# Patient Record
Sex: FEMALE | Race: WHITE
[De-identification: names, ages, dates, MRNs, and addresses within clinical notes are randomized per-mention and may not be internally consistent; named-entity substitution may affect disease eponyms.]

---

## 2017-03-31 ENCOUNTER — HOSPITAL ENCOUNTER (OUTPATIENT)
Dept: HOSPITAL 62 - WI | Age: 82
End: 2017-03-31
Attending: FAMILY MEDICINE
Payer: MEDICARE

## 2017-03-31 DIAGNOSIS — Z12.31: Primary | ICD-10-CM

## 2017-03-31 DIAGNOSIS — M81.0: ICD-10-CM

## 2017-03-31 PROCEDURE — G0202 SCR MAMMO BI INCL CAD: HCPCS

## 2017-03-31 PROCEDURE — 77067 SCR MAMMO BI INCL CAD: CPT

## 2017-03-31 PROCEDURE — 77080 DXA BONE DENSITY AXIAL: CPT

## 2017-05-08 ENCOUNTER — HOSPITAL ENCOUNTER (OUTPATIENT)
Dept: HOSPITAL 62 - RAD | Age: 82
End: 2017-05-08
Attending: FAMILY MEDICINE
Payer: MEDICARE

## 2017-05-08 DIAGNOSIS — K45.8: Primary | ICD-10-CM

## 2017-05-08 DIAGNOSIS — R51: ICD-10-CM

## 2017-05-08 PROCEDURE — 70450 CT HEAD/BRAIN W/O DYE: CPT

## 2017-05-08 PROCEDURE — 74176 CT ABD & PELVIS W/O CONTRAST: CPT

## 2018-04-30 ENCOUNTER — HOSPITAL ENCOUNTER (EMERGENCY)
Dept: HOSPITAL 62 - ER | Age: 83
LOS: 1 days | Discharge: HOME | End: 2018-05-01
Payer: MEDICARE

## 2018-04-30 DIAGNOSIS — E11.9: ICD-10-CM
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
## 2018-04-30 DIAGNOSIS — G89.29: ICD-10-CM

## 2018-04-30 DIAGNOSIS — R10.84: Primary | ICD-10-CM
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
## 2018-04-30 DIAGNOSIS — E03.9: ICD-10-CM

## 2018-04-30 DIAGNOSIS — M54.9: ICD-10-CM

## 2018-04-30 DIAGNOSIS — E78.00: ICD-10-CM

## 2018-04-30 LAB
ADD MANUAL DIFF: NO
ALBUMIN SERPL-MCNC: 3.8 G/DL (ref 3.5–5)
ALP SERPL-CCNC: 64 U/L (ref 38–126)
ALT SERPL-CCNC: 19 U/L (ref 9–52)
ANION GAP SERPL CALC-SCNC: 11 MMOL/L (ref 5–19)
AST SERPL-CCNC: 21 U/L (ref 14–36)
BASOPHILS # BLD AUTO: 0 10^3/UL (ref 0–0.2)
BASOPHILS NFR BLD AUTO: 0.5 % (ref 0–2)
BILIRUB DIRECT SERPL-MCNC: 0.3 MG/DL (ref 0–0.4)
BILIRUB SERPL-MCNC: 0.3 MG/DL (ref 0.2–1.3)
BUN SERPL-MCNC: 18 MG/DL (ref 7–20)
CALCIUM: 9.5 MG/DL (ref 8.4–10.2)
CHLORIDE SERPL-SCNC: 109 MMOL/L (ref 98–107)
CO2 SERPL-SCNC: 25 MMOL/L (ref 22–30)
EOSINOPHIL # BLD AUTO: 0.2 10^3/UL (ref 0–0.6)
EOSINOPHIL NFR BLD AUTO: 2.7 % (ref 0–6)
ERYTHROCYTE [DISTWIDTH] IN BLOOD BY AUTOMATED COUNT: 13.3 % (ref 11.5–14)
GLUCOSE SERPL-MCNC: 132 MG/DL (ref 75–110)
HCT VFR BLD CALC: 36.5 % (ref 36–47)
HGB BLD-MCNC: 12.2 G/DL (ref 12–15.5)
LIPASE SERPL-CCNC: 60.7 U/L (ref 23–300)
LYMPHOCYTES # BLD AUTO: 1.6 10^3/UL (ref 0.5–4.7)
LYMPHOCYTES NFR BLD AUTO: 26.2 % (ref 13–45)
MCH RBC QN AUTO: 32 PG (ref 27–33.4)
MCHC RBC AUTO-ENTMCNC: 33.5 G/DL (ref 32–36)
MCV RBC AUTO: 96 FL (ref 80–97)
MONOCYTES # BLD AUTO: 0.7 10^3/UL (ref 0.1–1.4)
MONOCYTES NFR BLD AUTO: 11.7 % (ref 3–13)
NEUTROPHILS # BLD AUTO: 3.7 10^3/UL (ref 1.7–8.2)
NEUTS SEG NFR BLD AUTO: 58.9 % (ref 42–78)
PLATELET # BLD: 252 10^3/UL (ref 150–450)
POTASSIUM SERPL-SCNC: 4.3 MMOL/L (ref 3.6–5)
PROT SERPL-MCNC: 6.6 G/DL (ref 6.3–8.2)
RBC # BLD AUTO: 3.81 10^6/UL (ref 3.72–5.28)
SODIUM SERPL-SCNC: 145.3 MMOL/L (ref 137–145)
TOTAL CELLS COUNTED % (AUTO): 100 %
WBC # BLD AUTO: 6.2 10^3/UL (ref 4–10.5)

## 2018-04-30 PROCEDURE — 36415 COLL VENOUS BLD VENIPUNCTURE: CPT

## 2018-04-30 PROCEDURE — 96374 THER/PROPH/DIAG INJ IV PUSH: CPT

## 2018-04-30 PROCEDURE — 85025 COMPLETE CBC W/AUTO DIFF WBC: CPT

## 2018-04-30 PROCEDURE — 96361 HYDRATE IV INFUSION ADD-ON: CPT

## 2018-04-30 PROCEDURE — 96375 TX/PRO/DX INJ NEW DRUG ADDON: CPT

## 2018-04-30 PROCEDURE — 96376 TX/PRO/DX INJ SAME DRUG ADON: CPT

## 2018-04-30 PROCEDURE — 83605 ASSAY OF LACTIC ACID: CPT

## 2018-04-30 PROCEDURE — 74177 CT ABD & PELVIS W/CONTRAST: CPT

## 2018-04-30 PROCEDURE — 99285 EMERGENCY DEPT VISIT HI MDM: CPT

## 2018-04-30 PROCEDURE — 83690 ASSAY OF LIPASE: CPT

## 2018-04-30 PROCEDURE — 74022 RADEX COMPL AQT ABD SERIES: CPT

## 2018-04-30 PROCEDURE — 80053 COMPREHEN METABOLIC PANEL: CPT

## 2018-04-30 NOTE — RADIOLOGY REPORT (SQ)
EXAM DESCRIPTION:  ACUTE ABDOMEN SERIES



COMPLETED DATE/TIME:  4/30/2018 9:32 pm



REASON FOR STUDY:  ABD PAIN HX OF HERNIA



COMPARISON:  None.



NUMBER OF VIEWS:  Three views.



TECHNIQUE:  Frontal chest, supine abdomen and upright/decubitus abdomen radiographic images acquired.




LIMITATIONS:  None.



FINDINGS:  CHEST: Vascular congestion.  Opacity at the left base.

FREE AIR: None. No abnormal gas collections.

BOWEL GAS PATTERN: Nonobstructive pattern. No dilated loops or air fluid levels.

CALCIFICATIONS: No suspicious calcifications.

HARDWARE: None in the abdomen.

SOFT TISSUES: No gross mass or suggestion of organomegaly.

BONES: Treated compression fracture.

OTHER: No other significant finding.



IMPRESSION:  No acute abdominal disease.

Vascular congestion.  Opacity at the left lung base.



TECHNICAL DOCUMENTATION:  JOB ID:  [id number]

 [employer]- All Rights Reserved



Reading location - IP/workstation name: [email or web address]

## 2018-05-01 VITALS — SYSTOLIC BLOOD PRESSURE: 117 MMHG | DIASTOLIC BLOOD PRESSURE: 63 MMHG

## 2018-05-01 NOTE — ER DOCUMENT REPORT
ED General





- General


Chief Complaint: Abdominal Pain >50


Stated Complaint: ABDOMINAL PAIN


Time Seen by Provider: 04/30/18 20:11


Mode of Arrival: Medic


Information source: Patient, Emergency Med Personnel


Notes: 


Patient presents to emergency department with complaint of abdominal and back 

pain.  Patient states that she has chronic abdominal and back pain however, 

patient reports that she fell 3 days ago at home onto her back and began having 

back pain the next day.  Patient reports that she has multiple hernias that are 

inoperable.  Patient was given fentanyl 50 mcg 2 doses by EMS with moderate 

relief of her abdominal and back pain.  Patient denies any fevers, nausea 

vomiting or diarrhea and states that she has had normal bowel movements last 

few days.  Patient has a very thick [place] accent.


TRAVEL OUTSIDE OF THE [place] IN LAST 30 DAYS: No





- Related Data


Allergies/Adverse Reactions: 


 





No Known Allergies Allergy (Verified 10/17/14 19:26)


 











Past Medical History





- General


Information source: Patient, Emergency Med Personnel





- Social History


Smoking Status: Unknown if Ever Smoked


Frequency of alcohol use: None


Family History: Reviewed & Not Pertinent


Patient has suicidal ideation: No


Patient has homicidal ideation: No





- Past Medical History


Cardiac Medical History: Reports: Hx Hypercholesterolemia


Pulmonary Medical History: Reports: Hx Asthma


Endocrine Medical History: Reports: Hx Diabetes Mellitus Type 2, Hx 

Hypothyroidism


Renal/ Medical History: Reports: Hx Peritoneal Dialysis


Malignancy Medical History: Reports: Hx Breast Cancer


Musculoskeltal Medical History: Reports Hx Arthritis


Psychiatric Medical History: Reports: Hx Anxiety, Hx Depression


Past Surgical History: Reports: Hx Abdominal Surgery, Hx Herniorrhaphy, Hx 

Mastectomy - AVOID RIGHT ARM.  Right mastectomy for breast cancer.





- Immunizations


Hx Diphtheria, Pertussis, Tetanus Vaccination: Yes





Review of Systems





- Review of Systems


Constitutional: No symptoms reported


EENT: No symptoms reported


Cardiovascular: No symptoms reported


Respiratory: No symptoms reported


Gastrointestinal: See HPI


Genitourinary: No symptoms reported


Female Genitourinary: No symptoms reported


Musculoskeletal: See HPI


Skin: No symptoms reported


Hematologic/Lymphatic: No symptoms reported


Neurological/Psychological: No symptoms reported





Physical Exam





- Vital signs


Vitals: 


 











Temp Pulse Resp BP Pulse Ox


 


 97.8 F   76   18   144/79 H  92 


 


 04/30/18 20:09  04/30/18 20:09  04/30/18 20:09  04/30/18 20:09  04/30/18 20:09














- Notes


Notes: 


PHYSICAL EXAMINATION:





GENERAL: Well-appearing, well-nourished in moderate distress.





HEAD: Atraumatic, normocephalic.





EYES: Pupils equal round and reactive to light, extraocular movements intact, 

conjunctiva are normal.





ENT: Nares patent, oropharynx clear without exudates.  Moist mucous membranes.





NECK: Normal range of motion, supple without lymphadenopathy





LUNGS: Breath sounds clear to auscultation bilaterally and equal.  No wheezes 

rales or rhonchi.





HEART: Regular rate and rhythm without murmurs





ABDOMEN: Soft, nontender, nondistended abdomen.  No guarding, no rebound.  

Large mass appreciated, consistent with patients hx of hernia .





Female : deferred





Musculoskeletal: Normal range of motion, no pitting or edema.  No cyanosis.  





NEUROLOGICAL: Cranial nerves grossly intact.  Normal speech.  Normal sensory, 

motor exams





PSYCH: Normal mood, normal affect.





SKIN: Warm, Dry, normal turgor, no rashes or lesions noted.





Course





- Re-evaluation


Re-evalutation: 


86-year-old female with abdominal and back pain.  Patient states that she fell 

3 days ago.  She denies striking her head or losing consciousness.  Patient 

reports that the pain is worse when she is moving and pain eases off when she 

lays still.  Patient reports that she lives alone and has no help at home.  

Patient states that she does get along fine with ambulation via a walker.  On 

initial examination patient appears to be in moderate distress with generalized 

abdominal pain and back pain, back pain as patient reported, no pain upon 

palpation elicited.  Patient reports that the pain started the day after she 

fell and that she also has chronic back pain.  Patient reports that her 

abdominal pain is not new, this patient states that she has a large hernia that 

is inoperable and has seen multiple's note multiple surgeons who have declined 

to operate on her.  





 


CBC unremarkable, comprehensive metabolic panel and lipase are unremarkable.  

Acute abdominal series does not show any obstructions or air-fluid levels.  

Patient is normotensive, not tachycardic and not tachypneic.  Will have patient 

start drinking oral contrast and will scan CT abdomen pelvis with IV and oral 

contrast to evaluate her abdominal pain and abdominal hernia that is clearly 

present on physical exam. 








CT abdomen pelvis is unchanged from previous, no acute findings.  Patient does 

report that her pain has mostly resolved however she does still have some back 

pain with movement.  I feel this is likely due to her mechanical fall.  I will 

have patient ambulate around department to make sure that she is steady on her 

feet enough for discharge home as she does live alone.  Patient is agreeable to 

this plan and states that she would very much like to go home.





Nursing staff ambulated patient and patient had a steady gait with a brisk 

pace.  Patient's vital signs are stable.  Will arrange for wheelchair transport 

home as patient will need assistance from the parking lot up into her 

apartment.  Patient declined need for any prescription pain medications as 

patient states that Tylenol and Motrin typically help with her pain.








- Vital Signs


Vital signs: 


 











Temp Pulse Resp BP Pulse Ox


 


 97.8 F   76   18   144/79 H  94 


 


 04/30/18 20:09  04/30/18 20:09  04/30/18 20:09  04/30/18 20:09  04/30/18 20:38














- Laboratory


Result Diagrams: 


 04/30/18 20:30





 04/30/18 20:30


Laboratory results interpreted by me: 


 











  04/30/18





  20:30


 


Sodium  145.3 H


 


Chloride  109 H


 


Glucose  132 H














Discharge





- Discharge


Clinical Impression: 


Fall


Qualifiers:


 Encounter type: initial encounter Qualified Code(s): W19.XXXA - Unspecified 

fall, initial encounter





Abdominal pain


Qualifiers:


 Abdominal location: generalized Qualified Code(s): R10.84 - Generalized 

abdominal pain





Condition: Stable


Disposition: HOME, SELF-CARE


Additional Instructions: 


You were evaluated in the emergency department tonight for back pain and 

abdominal pain.  Your CT scan and xrays were negative for any acute 

abnormalities.  Your labs results were normal.  Please follow-up with your 

primary care provider later this week for recheck, call this morning for an 

appointment.  Please return to the emergency department if you develop 

worsening pain, fever, you pass out, or feeling unwell.  


Referrals: 


YISSEL MONTIEL MD [Primary Care Provider] - Follow up as needed

## 2018-05-01 NOTE — RADIOLOGY REPORT (SQ)
EXAM DESCRIPTION:  CT ABDOMEN AND PELVIS WITH CONTRAST



CLINICAL HISTORY: Diffuse abdominal pain



COMPARISON: 5/8/2017



TECHNIQUE: CT of the abdomen and pelvis performed following IV

administration of 79 mL of Isovue-370.



DLP: 1863.24 mGycm



FINDINGS: 



Lung Bases: Linear left basilar opacities likely related to

scarring or atelectasis. Bibasilar dependent atelectasis.

Cardiomegaly.



Bones: Degenerative spondylosis of the visualized spine. Prior

T12 vertebroplasty.



Abdomen:



Liver: The liver has normal size and density. No intrahepatic

mass or biliary dilatation. 

Gallbladder: Calcified gallstones. No pericholecystic

inflammatory change.

Spleen, Pancreas, and Adrenal Glands:  Calcified splenic

granulomas. No abnormalities of the pancreas or adrenal glands.

Kidneys:  The kidneys have normal size and contour without

evidence of solid mass or hydronephrosis.  

Vasculature: Aortoiliac atherosclerosis. IVC is unremarkable. 

The portal vein is patent. The proximal visceral and renal

arteries are patent. 

Stomach:  The stomach and duodenum have normal course. 

Other:  No free intraperitoneal air.  Ventral hernia containing

mesenteric fat as well as loops of large and small bowel without

evidence of obstruction. 



Pelvis:



Bladder:  Urinary bladder is unremarkable. 

Bowel:  Scattered diverticula throughout the colon without

pericolic fat stranding. No dilated loops of large or small

bowel.

Appendix:  No evidence of appendicitis. The appendix is not

definitely identified. Postoperative change in the right inguinal

region.

Pelvis: Prior hysterectomy.





IMPRESSION: 

1. No acute inflammatory or obstructive process identified.



2. Large ventral hernia containing loops of large and small bowel

without evidence of obstruction.



3. Diverticulosis without evidence of diverticulitis.



4. Cholelithiasis without CT evidence of acute cholecystitis.



This exam was performed according to our departmental

dose-optimization program, which includes automated exposure

control, adjustment of the mA and/or kV according to patient size

and/or use of iterative reconstruction technique.

## 2018-05-02 ENCOUNTER — HOSPITAL ENCOUNTER (OUTPATIENT)
Dept: HOSPITAL 62 - OD | Age: 83
End: 2018-05-02
Attending: FAMILY MEDICINE
Payer: MEDICARE

## 2018-05-02 DIAGNOSIS — M54.5: Primary | ICD-10-CM

## 2018-05-02 PROCEDURE — 72110 X-RAY EXAM L-2 SPINE 4/>VWS: CPT

## 2018-05-02 PROCEDURE — 72070 X-RAY EXAM THORAC SPINE 2VWS: CPT

## 2018-05-02 NOTE — RADIOLOGY REPORT (SQ)
EXAM DESCRIPTION:  LUMBAR SPINE COMPLETE



COMPLETED DATE/TIME:  5/2/2018 5:15 pm



REASON FOR STUDY:  LOW BACK PAIN M54.5  LOW BACK PAIN



COMPARISON:  MR 4/6/2016 radiographs 5/1/2015



NUMBER OF VIEWS:  Five views including obliques.



TECHNIQUE:  AP, lateral, oblique, and sacral radiographic images acquired of the lumbar spine.



LIMITATIONS:  None.



FINDINGS:  MINERALIZATION: Normal.

SEGMENTATION: Normal.  No transitional anatomy.

ALIGNMENT: Normal.

VERTEBRAE: Compression changes with kyphoplasty at T12.

DISCS: Disc spaces are narrowed at L4-5 and L5-S1.

POSTERIOR ELEMENTS: Mild hypertrophic facet changes are present from L4-S1.

HARDWARE: None in the spine.

PARASPINAL SOFT TISSUES: Normal.

PELVIS: Intact as visualized. No fractures or worrisome bone lesions. SI joints intact.

OTHER: No other significant finding.



IMPRESSION:  Degenerative disc disease.  Facet arthropathy.  Old compression changes with kyphoplasty
 at T12.



TECHNICAL DOCUMENTATION:  JOB ID:  [id number]

 2011 [employer]- All Rights Reserved



Reading location - IP/workstation name: [email or web address]

## 2018-05-02 NOTE — RADIOLOGY REPORT (SQ)
EXAM DESCRIPTION:  T SPINE AP/LAT



COMPLETED DATE/TIME:  5/2/2018 5:15 pm



REASON FOR STUDY:  LOW BACK PAIN M54.5  LOW BACK PAIN



COMPARISON:  CT 4/19/2014



NUMBER OF VIEWS:  Two views.



TECHNIQUE:  AP and lateral radiographic images acquired of the thoracic spine.



LIMITATIONS:  None.



FINDINGS:  MINERALIZATION: Normal.

ALIGNMENT: Normal.  No scoliosis.

VERTEBRAE: Old compression changes at T12 with kyphoplasty.

DISCS: No significant loss of height or significant narrowing.  No large osteophytes.

HARDWARE: None in the spine.

MEDIASTINUM AND SOFT TISSUES: Normal heart size and aortic contour.  No soft tissue abnormality.

VISUALIZED LUNG FIELDS: Clear.

OTHER: No other significant finding.



IMPRESSION:  No acute findings in the thoracic spine.



TECHNICAL DOCUMENTATION:  JOB ID:  [id number]

 2011 [employer]- All Rights Reserved



Reading location - IP/workstation name: [email or web address]

## 2019-11-14 ENCOUNTER — HOSPITAL ENCOUNTER (OUTPATIENT)
Dept: HOSPITAL 62 - WI | Age: 84
End: 2019-11-14
Attending: PHYSICIAN ASSISTANT
Payer: MEDICARE

## 2019-11-14 DIAGNOSIS — M81.0: Primary | ICD-10-CM

## 2019-11-14 DIAGNOSIS — Z78.0: ICD-10-CM

## 2019-11-14 PROCEDURE — 77080 DXA BONE DENSITY AXIAL: CPT

## 2019-11-14 NOTE — WOMENS IMAGING REPORT
EXAM DESCRIPTION:  BONE DENSITY HIP/SPINE



COMPLETED DATE/TIME:  11/14/2019 9:55 am



REASON FOR STUDY:  Z78.0 BONE DENSITY Z78.0  ASYMPTOMATIC MENOPAUSAL STATE



COMPARISON:   3/31/2017



TECHNIQUE:  Dual-Energy X-ray Absorptiometry (DEXA) of the AP Spine and Hip.



LIMITATIONS:  None.



FINDINGS:  LUMBAR SPINE:

The bone mineral density (BMD) measured from L1-L4 in the AP projection correlates with a T-score of 
-2.0, which is osteopenia as defined by the World Health Organization.  There has been a -3.0% change
 since baseline.

HIP:

The bone mineral density (BMD) measured in the left hip correlates with a T-score of -2.2, which is o
steopenia as defined by the World Health Organization.  There has been a -4.5% change since baseline.




IMPRESSION:  1. LUMBAR SPINE: OSTEOPENIA.

2.  HIP: OSTEOPENIA.



COMMENT:  The World Health Organization defines low BMD as follows:

T-score:

Normal:  Greater than -1.0

Osteopenia: Between -1.0 and -2.5

Osteoporosis:  Less than -2.5 without fractures

Established osteoporosis:  Less than -2.5 with fractures

In general, you may wish to consider:

Diagnosis          Treatment                     Follow-up DEXA

Normal BMD      Prevention                    2-3 years

Osteopenia       Prevention/Therapy        1-2 years

Osteoporosis     Therapy                        Yearly



TECHNICAL DOCUMENTATION:  JOB ID:  [id number]

 [employer]- All Rights Reserved



Reading location - IP/workstation name: [email or web address]

## 2020-02-04 ENCOUNTER — HOSPITAL ENCOUNTER (OUTPATIENT)
Dept: HOSPITAL 62 - OD | Age: 85
End: 2020-02-04
Attending: PHYSICIAN ASSISTANT
Payer: MEDICARE

## 2020-02-04 DIAGNOSIS — E78.5: ICD-10-CM

## 2020-02-04 DIAGNOSIS — M20.11: ICD-10-CM

## 2020-02-04 DIAGNOSIS — M79.671: ICD-10-CM

## 2020-02-04 DIAGNOSIS — M54.5: ICD-10-CM

## 2020-02-04 DIAGNOSIS — M25.522: ICD-10-CM

## 2020-02-04 DIAGNOSIS — M12.871: Primary | ICD-10-CM

## 2020-02-04 DIAGNOSIS — E03.9: ICD-10-CM

## 2020-02-04 LAB
ADD MANUAL DIFF: NO
ALBUMIN SERPL-MCNC: 4.2 G/DL (ref 3.5–5)
ALP SERPL-CCNC: 56 U/L (ref 38–126)
ANION GAP SERPL CALC-SCNC: 10 MMOL/L (ref 5–19)
AST SERPL-CCNC: 26 U/L (ref 14–36)
BASOPHILS # BLD AUTO: 0 10^3/UL (ref 0–0.2)
BASOPHILS NFR BLD AUTO: 0.7 % (ref 0–2)
BILIRUB DIRECT SERPL-MCNC: 0.2 MG/DL (ref 0–0.4)
BILIRUB SERPL-MCNC: 0.7 MG/DL (ref 0.2–1.3)
BUN SERPL-MCNC: 20 MG/DL (ref 7–20)
CALCIUM: 9.6 MG/DL (ref 8.4–10.2)
CHLORIDE SERPL-SCNC: 104 MMOL/L (ref 98–107)
CHOLEST SERPL-MCNC: 163.9 MG/DL (ref 0–200)
CO2 SERPL-SCNC: 28 MMOL/L (ref 22–30)
EOSINOPHIL # BLD AUTO: 0.2 10^3/UL (ref 0–0.6)
EOSINOPHIL NFR BLD AUTO: 3.6 % (ref 0–6)
ERYTHROCYTE [DISTWIDTH] IN BLOOD BY AUTOMATED COUNT: 13.1 % (ref 11.5–14)
GLUCOSE SERPL-MCNC: 99 MG/DL (ref 75–110)
HCT VFR BLD CALC: 39.8 % (ref 36–47)
HGB BLD-MCNC: 13.6 G/DL (ref 12–15.5)
LDLC SERPL DIRECT ASSAY-MCNC: 92 MG/DL (ref ?–100)
LYMPHOCYTES # BLD AUTO: 1.4 10^3/UL (ref 0.5–4.7)
LYMPHOCYTES NFR BLD AUTO: 32 % (ref 13–45)
MCH RBC QN AUTO: 33.5 PG (ref 27–33.4)
MCHC RBC AUTO-ENTMCNC: 34.1 G/DL (ref 32–36)
MCV RBC AUTO: 98 FL (ref 80–97)
MONOCYTES # BLD AUTO: 0.5 10^3/UL (ref 0.1–1.4)
MONOCYTES NFR BLD AUTO: 11.2 % (ref 3–13)
NEUTROPHILS # BLD AUTO: 2.2 10^3/UL (ref 1.7–8.2)
NEUTS SEG NFR BLD AUTO: 52.5 % (ref 42–78)
PLATELET # BLD: 225 10^3/UL (ref 150–450)
POTASSIUM SERPL-SCNC: 4.3 MMOL/L (ref 3.6–5)
PROT SERPL-MCNC: 7.7 G/DL (ref 6.3–8.2)
RBC # BLD AUTO: 4.05 10^6/UL (ref 3.72–5.28)
TOTAL CELLS COUNTED % (AUTO): 100 %
TRIGL SERPL-MCNC: 143 MG/DL (ref ?–150)
VLDLC SERPL CALC-MCNC: 29 MG/DL (ref 10–31)
WBC # BLD AUTO: 4.3 10^3/UL (ref 4–10.5)

## 2020-02-04 PROCEDURE — 85025 COMPLETE CBC W/AUTO DIFF WBC: CPT

## 2020-02-04 PROCEDURE — 36415 COLL VENOUS BLD VENIPUNCTURE: CPT

## 2020-02-04 PROCEDURE — 80061 LIPID PANEL: CPT

## 2020-02-04 PROCEDURE — 84443 ASSAY THYROID STIM HORMONE: CPT

## 2020-02-04 PROCEDURE — 80053 COMPREHEN METABOLIC PANEL: CPT

## 2020-02-04 PROCEDURE — 72110 X-RAY EXAM L-2 SPINE 4/>VWS: CPT

## 2020-02-04 NOTE — RADIOLOGY REPORT (SQ)
EXAM DESCRIPTION:  ELBOW LEFT >2 VIEWS



COMPLETED DATE/TIME:  2/4/2020 11:41 am



REASON FOR STUDY:  PAIN E03.9  HYPOTHYROIDISM, UNSPECIFIED E78.5  HYPERLIPIDEMIA, UNSPECIFIED



COMPARISON:  None.



NUMBER OF VIEWS:  Four views.



TECHNIQUE:  AP, lateral, and both oblique radiographic images acquired of the left elbow.



LIMITATIONS:  None.



FINDINGS:  MINERALIZATION: Normal.

BONES: No acute fracture or dislocation.  No worrisome bone lesions.

JOINT: No effusion.

SOFT TISSUES: No soft tissue swelling.  No foreign body.

OTHER: No other significant finding.



IMPRESSION:  NEGATIVE STUDY OF THE LEFT ELBOW. NO RADIOGRAPHIC EVIDENCE OF ACUTE INJURY.



TECHNICAL DOCUMENTATION:  JOB ID:  [id number]

 2011 [employer]- All Rights Reserved



Reading location - IP/workstation name: [email or web address]

## 2020-02-04 NOTE — RADIOLOGY REPORT (SQ)
EXAM DESCRIPTION:  FOOT RIGHT COMPLETE



COMPLETED DATE/TIME:  2/4/2020 11:41 am



REASON FOR STUDY:  PAIN E03.9  HYPOTHYROIDISM, UNSPECIFIED E78.5  HYPERLIPIDEMIA, UNSPECIFIED



COMPARISON:  None.



NUMBER OF VIEWS:  Three views.



TECHNIQUE:  AP, lateral and oblique  radiographic images acquired of the right foot.



LIMITATIONS:  None.



FINDINGS:  MINERALIZATION: Osteopenia.

BONES: No acute fracture or dislocation.  No worrisome bone lesions.

JOINTS: Hallux valgus.  No dislocation.  Mild arthropathy 1st metatarsophalangeal joint.

SOFT TISSUES: No soft tissue swelling.  No foreign body.

OTHER: No other significant finding.



IMPRESSION:  No acute findings.



TECHNICAL DOCUMENTATION:  JOB ID:  [id number]

 2011 [employer]- All Rights Reserved



Reading location - IP/workstation name: [email or web address]